# Patient Record
Sex: FEMALE | Race: BLACK OR AFRICAN AMERICAN | NOT HISPANIC OR LATINO | Employment: PART TIME | URBAN - METROPOLITAN AREA
[De-identification: names, ages, dates, MRNs, and addresses within clinical notes are randomized per-mention and may not be internally consistent; named-entity substitution may affect disease eponyms.]

---

## 2017-04-10 ENCOUNTER — HOSPITAL ENCOUNTER (EMERGENCY)
Facility: HOSPITAL | Age: 67
Discharge: HOME | End: 2017-04-10
Attending: SPECIALIST
Payer: MEDICARE

## 2017-04-10 VITALS
TEMPERATURE: 99 F | RESPIRATION RATE: 18 BRPM | DIASTOLIC BLOOD PRESSURE: 86 MMHG | BODY MASS INDEX: 27.6 KG/M2 | WEIGHT: 150 LBS | OXYGEN SATURATION: 99 % | SYSTOLIC BLOOD PRESSURE: 131 MMHG | HEIGHT: 62 IN | HEART RATE: 67 BPM

## 2017-04-10 DIAGNOSIS — M25.562 ACUTE PAIN OF LEFT KNEE: Primary | ICD-10-CM

## 2017-04-10 DIAGNOSIS — J01.00 ACUTE MAXILLARY SINUSITIS, RECURRENCE NOT SPECIFIED: ICD-10-CM

## 2017-04-10 DIAGNOSIS — T14.90XA TRAUMA: ICD-10-CM

## 2017-04-10 PROCEDURE — 96372 THER/PROPH/DIAG INJ SC/IM: CPT

## 2017-04-10 PROCEDURE — 99284 EMERGENCY DEPT VISIT MOD MDM: CPT | Mod: 25

## 2017-04-10 PROCEDURE — 25000003 PHARM REV CODE 250: Performed by: SPECIALIST

## 2017-04-10 PROCEDURE — 63600175 PHARM REV CODE 636 W HCPCS: Performed by: SPECIALIST

## 2017-04-10 RX ORDER — TRAMADOL HYDROCHLORIDE 50 MG/1
50 TABLET ORAL
Status: COMPLETED | OUTPATIENT
Start: 2017-04-10 | End: 2017-04-10

## 2017-04-10 RX ORDER — TRAMADOL HYDROCHLORIDE 50 MG/1
50 TABLET ORAL EVERY 6 HOURS PRN
Qty: 20 TABLET | Refills: 0 | Status: SHIPPED | OUTPATIENT
Start: 2017-04-10 | End: 2017-04-19 | Stop reason: ALTCHOICE

## 2017-04-10 RX ORDER — PREDNISONE 5 MG/1
TABLET ORAL
Qty: 21 TABLET | Refills: 0 | Status: SHIPPED | OUTPATIENT
Start: 2017-04-10 | End: 2017-04-19 | Stop reason: ALTCHOICE

## 2017-04-10 RX ORDER — KETOROLAC TROMETHAMINE 30 MG/ML
30 INJECTION, SOLUTION INTRAMUSCULAR; INTRAVENOUS
Status: COMPLETED | OUTPATIENT
Start: 2017-04-10 | End: 2017-04-10

## 2017-04-10 RX ORDER — AMOXICILLIN AND CLAVULANATE POTASSIUM 875; 125 MG/1; MG/1
1 TABLET, FILM COATED ORAL 2 TIMES DAILY
Qty: 14 TABLET | Refills: 0 | Status: SHIPPED | OUTPATIENT
Start: 2017-04-10 | End: 2017-04-20

## 2017-04-10 RX ADMIN — TRAMADOL HYDROCHLORIDE 50 MG: 50 TABLET, FILM COATED ORAL at 10:04

## 2017-04-10 RX ADMIN — KETOROLAC TROMETHAMINE 30 MG: 30 INJECTION, SOLUTION INTRAMUSCULAR at 10:04

## 2017-04-10 NOTE — ED AVS SNAPSHOT
[facility]  [address] [place] [address]               Chelly Mathur   4/10/2017 10:41 AM   ED    Description:  Female : 1950   Department:  [facility]           Your Care was Coordinated By:     Provider Role From To    Patricia Caldera MD Attending Provider 04/10/17 1041 --      Reason for Visit     Knee Pain     Nasal Congestion           Diagnoses this Visit        Comments    Acute pain of left knee    -  Primary     Trauma         Acute maxillary sinusitis, recurrence not specified           ED Disposition     None           To Do List           Follow-up Information     Schedule an appointment as soon as possible for a visit with Zahra Ames MD.    Specialty:  Internal Medicine    Contact information:    [address]  [place] [facility] [address] [phone]          Follow up with [facility].    Specialty:  Orthopedics    Why:  for further eval if symptoms continue    Contact information:    [address]  [facility] [address] [phone]    Additional information:    (off [facility]) 2nd floor        Follow up with [facility].    Specialty:  Emergency Medicine    Why:  If symptoms worsen or any new symptoms develop especially sob, calf pain/swelling/discoloration    Contact information:    [address] [facility] [address] [phone]       These Medications        Disp Refills Start End    amoxicillin-clavulanate 875-125mg (AUGMENTIN) 875-125 mg per tablet 14 tablet 0 4/10/2017 2017    Take 1 tablet by mouth 2 (two) times daily. - Oral    predniSONE (DELTASONE) 5 MG tablet 21 tablet 0 4/10/2017     Take 6 on day 1, 5 on day 2, 4 on day 3, 3 on day 4, 2 on day 5, and 1 on day 6    tramadol (ULTRAM) 50 mg tablet 20 tablet 0 4/10/2017 2017    Take 1 tablet (50 mg total) by mouth every 6 (six) hours as needed for  Pain. - Oral      [facility]     [facility] Care Line - 24/7 Assistance  Unless otherwise directed by your provider, please contact [facility], our nurse care line that is available for 24/7 assistance.     Registered nurses in the [facility] provide: appointment scheduling, clinical advisement, health education, and other advisory services.  Call: [phone] (toll free)               Medications           Message regarding Medications     Verify the changes and/or additions to your medication regime listed below are the same as discussed with your clinician today.  If any of these changes or additions are incorrect, please notify your healthcare provider.        START taking these NEW medications        Refills    amoxicillin-clavulanate 875-125mg (AUGMENTIN) 875-125 mg per tablet 0    Sig: Take 1 tablet by mouth 2 (two) times daily.    Class: Print    Route: Oral    predniSONE (DELTASONE) 5 MG tablet 0    Sig: Take 6 on day 1, 5 on day 2, 4 on day 3, 3 on day 4, 2 on day 5, and 1 on day 6    Class: Print    tramadol (ULTRAM) 50 mg tablet 0    Sig: Take 1 tablet (50 mg total) by mouth every 6 (six) hours as needed for Pain.    Class: Print    Route: Oral      These medications were administered today        Dose Freq    ketorolac injection 30 mg 30 mg ED 1 Time    Sig: Inject 30 mg into the muscle ED 1 Time.    Class: Normal    Route: Intramuscular    tramadol tablet 50 mg 50 mg ED 1 Time    Sig: Take 1 tablet (50 mg total) by mouth ED 1 Time.    Class: Normal    Route: Oral           Verify that the below list of medications is an accurate representation of the medications you are currently taking.  If none reported, the list may be blank. If incorrect, please contact your healthcare provider. Carry this list with you in case of emergency.           Current Medications     amoxicillin-clavulanate 875-125mg (AUGMENTIN) 875-125 mg per tablet Take 1 tablet by mouth 2 (two) times  "daily.    predniSONE (DELTASONE) 5 MG tablet Take 6 on day 1, 5 on day 2, 4 on day 3, 3 on day 4, 2 on day 5, and 1 on day 6    tramadol (ULTRAM) 50 mg tablet Take 1 tablet (50 mg total) by mouth every 6 (six) hours as needed for Pain.           Clinical Reference Information           Your Vitals Were     BP Pulse Temp Resp Height Weight    146/77 (BP Location: Right arm, Patient Position: Sitting) 77 98.8 °F (37.1 °C) (Oral) 18 5' 2" (1.575 m) 68 kg (150 lb)    SpO2 BMI             98% 27.44 kg/m2         Allergies as of 4/10/2017     No Known Allergies      Immunizations Administered on Date of Encounter - 4/10/2017     None      ED Micro, Lab, POCT     None      ED Imaging Orders     Start Ordered       Status Ordering Provider    04/10/17 1122 04/10/17 1046  X-Ray Knee Complete 4 or more Views Left  1 time imaging      Final result     04/10/17 1047 04/10/17 1046    1 time imaging,   Status:  Canceled      Canceled       Discharge References/Attachments     KNEE SPRAIN OF THE COLLATERAL LIGAMENTS (ENGLISH)    SINUSITIS (ANTIBIOTIC TREATMENT) (ENGLISH)      [facility] Sign-Up     Activating your [facility] account is as easy as 1-2-3!     1) Visit [email or web address], select Sign Up Now, enter this activation code and your date of birth, then select Next.  [email or web address]  Expires: 5/25/2017 11:51 AM      2) Create a username and password to use when you visit [facility] in the future and select a security question in case you lose your password and select Next.    3) Enter your e-mail address and click Sign Up!    Additional Information  If you have questions, please e-mail [email or web address] or call [phone] to talk to our [facility] staff. Remember, [facility] is NOT to be used for urgent needs. For medical emergencies, dial 911.         Smoking Cessation     If you would like to quit smoking:   You may be eligible for free services if you are a [place] resident and started smoking cigarettes before " September 1, 1988.  Call the Smoking Cessation Trust (SCT) toll free at (418) 020-9998 or (822) 310-6882.   Call 1-800-QUIT-NOW if you do not meet the above criteria.   Contact us via email: [email or web address]   View our website for more information: [email or web address]         [facility]  complies with applicable Federal civil rights laws and does not discriminate on the basis of race, color, national origin, age, disability, or sex.        Language Assistance Services     ATTENTION: Language assistance services are available, free of charge. Please call [phone].      ATENCIÓN: Si habla español, tiene a noel disposición servicios gratuitos de asistencia lingüística. Llame al [phone].     CHÚ Ý: N?u b?n nói Ti?ng Vi?t, có các d?ch v? h? tr? ngôn ng? mi?n phí dành cho b?n. G?i s? [phone].

## 2017-04-10 NOTE — ED PROVIDER NOTES
SCRIBE #1 NOTE: I, Kandis Diehl, am scribing for, and in the presence of, Patricia Caldera MD. I have scribed the entire note.      History      Chief Complaint   Patient presents with    Knee Pain     pt states she hurt her left knee after falling into a hole    Nasal Congestion       Review of patient's allergies indicates:  No Known Allergies     HPI   HPI    4/10/2017, 10:42 AM   History obtained from the patient      History of Present Illness: Chelly Mathur is a 67 y.o. female patient who presents to the Emergency Department for left knee pain which onset suddenly 1 week ago. Pt reports falling in a hole and landing on her knee. Pt reports twisting her knee during fall. Sx have been constant and moderate in severity. No modifying factors noted. No associated sx included. Pt denies any fever, chills, decreased ROM of left knee, paresthesias, or left knee swelling.    Pt is also complaining of sinus pressure which onset gradually a few days ago. Sx have been constant and moderate in severity. No modifying factors noted. Associated sx include sore throat, rhinorrhea, congestion, and eye itching. Pt denies any fever, chills, cough, trouble swallowing, ear pain, neck pain/stiffness, or rash. No further complaints at this time.       Arrival mode: Personal vehicle      PCP: Zahra Ames MD     Past Medical History:  Past medical history reviewed not relevant      Past Surgical History:  Past surgical history reviewed not relevant      Family History:  Family history reviewed not relevant      Social History:  Social History Main Topics    Smoking status: Unknown if ever smoked    Smokeless tobacco: Unknown if ever used    Alcohol Use: Unknown drinking history    Drug Use: Unknown if ever used    Sexual Activity: Unknown         ROS   Review of Systems   Constitutional: Negative for chills, diaphoresis and fever.   HENT: Positive for congestion, rhinorrhea, sinus pressure and sore throat. Negative for  ear discharge, ear pain and trouble swallowing.    Eyes: Positive for itching. Negative for photophobia, pain, discharge, redness and visual disturbance.   Respiratory: Negative for cough and shortness of breath.    Cardiovascular: Negative for chest pain.   Gastrointestinal: Negative for abdominal pain, blood in stool, constipation, diarrhea, nausea and vomiting.   Genitourinary: Negative for dysuria.   Musculoskeletal: Positive for arthralgias (left knee). Negative for back pain, joint swelling, neck pain and neck stiffness.   Skin: Negative for rash.   Neurological: Negative for dizziness, weakness, light-headedness, numbness and headaches.        (-) paresthesias   Hematological: Does not bruise/bleed easily.     Physical Exam    Initial Vitals   BP Pulse Resp Temp SpO2   04/10/17 1039 04/10/17 1039 04/10/17 1039 04/10/17 1039 04/10/17 1039   146/77 77 18 98.8 °F (37.1 °C) 98 %      Physical Exam  Nursing Notes and Vital Signs Reviewed.  Constitutional: Patient is in no acute distress. Awake and alert. Well-developed and well-nourished.  Head: Atraumatic. Normocephalic. Maxillary sinus tenderness.    Eyes: PERRL. EOM intact. Conjunctivae are not pale. No scleral icterus.  Nose: Patent nares. Turbinates are normal. No drainage.   Throat: Moist mucous membranes. Posterior oropharynx is symmetric without erythema. Tonsillar exudate is not present. No trismus. Normal handling of secretions. No stridor.  Neck: Supple. Full ROM. No lymphadenopathy. No meningeal signs.   Cardiovascular: Regular rate. Regular rhythm. No murmurs, rubs, or gallops. Distal pulses are 2+ and symmetric.  Pulmonary/Chest: No respiratory distress. Clear to auscultation bilaterally. No wheezing, rales, or rhonchi.  Abdominal: Soft and non-distended.    Musculoskeletal: Moves all extremities. No obvious deformities. No edema. No calf tenderness. Left Knee: Skin intact. No effusion. Medial point tenderness. Tenderness directly over the patella.  "Negative anterior and posterior drawer test. Negative Varus and Valgus stress test.   Skin: Warm and dry.  Neurological:  Alert, awake, and appropriate.  Normal speech.  No acute focal neurological deficits are appreciated.  Psychiatric: Normal affect. Good eye contact. Appropriate in content.    ED Course    Procedures  ED Vital Signs:  Vitals:    04/10/17 1039 04/10/17 1158   BP: (!) 146/77 131/86   Pulse: 77 67   Resp: 18    Temp: 98.8 °F (37.1 °C)    TempSrc: Oral    SpO2: 98% 99%   Weight: 68 kg (150 lb)    Height: 5' 2" (1.575 m)        Imaging Results:  Imaging Results         X-Ray Knee Complete 4 or more Views Left (Final result) Result time:  04/10/17 11:46:45    Procedure changed from X-Ray Knee 3 View Left        Final result by Juan Donaldson MD (04/10/17 11:46:45)    Impression:     No acute osseous abnormality identified in the left knee.        Electronically signed by: JUAN DONALDSON MD  Date:     04/10/17  Time:    11:46     Narrative:    Left knee x-ray 4 views    Clinical history: Left knee pain and acute injury.    Comparison: None    Findings: No fracture or dislocation identified.  No significant joint effusion identified. Joint spaces appear well maintained.                     The Emergency Provider reviewed the vital signs and test results, which are outlined above.    ED Discussion     11:39 AM Reassessed pt at this time.   Discussed with pt all pertinent ED information and results. Discussed pt dx and plan of tx. Gave pt all f/u and return to the ED instructions. All questions and concerns were addressed at this time. Pt expresses understanding of information and instructions, and is comfortable with plan to discharge. Pt is stable for discharge.    I discussed with patient and/or family/caretaker that evaluation in the ED does not suggest any emergent or life threatening medical conditions requiring immediate intervention beyond what was provided in the ED, and I believe patient is " safe for discharge. Discussed DVT precautions. Instructed pt to return to ED for any new leg swelling or calf pain.    ED Medication(s):  Medications   ketorolac injection 30 mg (30 mg Intramuscular Given 4/10/17 1054)   tramadol tablet 50 mg (50 mg Oral Given 4/10/17 1054)       Discharge Medication List as of 4/10/2017 11:51 AM      START taking these medications    Details   amoxicillin-clavulanate 875-125mg (AUGMENTIN) 875-125 mg per tablet Take 1 tablet by mouth 2 (two) times daily., Starting 4/10/2017, Until u 4/20/17, Print      predniSONE (DELTASONE) 5 MG tablet Take 6 on day 1, 5 on day 2, 4 on day 3, 3 on day 4, 2 on day 5, and 1 on day 6, Print      tramadol (ULTRAM) 50 mg tablet Take 1 tablet (50 mg total) by mouth every 6 (six) hours as needed for Pain., Starting 4/10/2017, Until u 4/20/17, Print           Follow-up Information     Schedule an appointment as soon as possible for a visit with Zahra Ames MD.    Specialty:  Internal Medicine    Contact information:    [address]   [address] [facility] [address] [phone]          Follow up with [facility].    Specialty:  Orthopedics    Why:  for further eval if symptoms continue    Contact information:    [address] [facility] [address] [phone]    Additional information:    (off [facility]) 2nd floor        Follow up with [facility]    Specialty:  Emergency Medicine    Why:  If symptoms worsen or any new symptoms develop especially sob, calf pain/swelling/discoloration    Contact information:    [address]  [facility] [address] [phone]          Medical Decision Making    Medical Decision Making:   Clinical Tests:   Radiological Study: Ordered and Reviewed           Scribe Attestation:   Scribe #1: I performed the above scribed service and the documentation accurately describes the services I performed. I attest to the accuracy of the  note.    Attending:   Physician Attestation Statement for Scribe #1: I, Patricia Caldera MD, personally performed the services described in this documentation, as scribed by Kandis Diehl, in my presence, and it is both accurate and complete.          Clinical Impression       ICD-10-CM ICD-9-CM   1. Acute pain of left knee M25.562 719.46   2. Trauma T14.90 959.9   3. Acute maxillary sinusitis, recurrence not specified J01.00 461.0       Disposition:   Disposition: Discharged  Condition: Stable         Patricia Caldera MD  04/11/17 1532

## 2017-04-19 ENCOUNTER — OFFICE VISIT (OUTPATIENT)
Dept: ORTHOPEDICS | Facility: CLINIC | Age: 67
End: 2017-04-19
Payer: MEDICARE

## 2017-04-19 ENCOUNTER — HOSPITAL ENCOUNTER (OUTPATIENT)
Dept: RADIOLOGY | Facility: HOSPITAL | Age: 67
Discharge: HOME | End: 2017-04-19
Attending: ORTHOPAEDIC SURGERY
Payer: MEDICARE

## 2017-04-19 VITALS
HEART RATE: 95 BPM | WEIGHT: 142 LBS | SYSTOLIC BLOOD PRESSURE: 126 MMHG | DIASTOLIC BLOOD PRESSURE: 76 MMHG | BODY MASS INDEX: 26.13 KG/M2 | HEIGHT: 62 IN

## 2017-04-19 DIAGNOSIS — M25.562 ACUTE PAIN OF LEFT KNEE: Primary | ICD-10-CM

## 2017-04-19 DIAGNOSIS — M25.562 LEFT KNEE PAIN, UNSPECIFIED CHRONICITY: Primary | ICD-10-CM

## 2017-04-19 DIAGNOSIS — M25.562 LEFT KNEE PAIN, UNSPECIFIED CHRONICITY: ICD-10-CM

## 2017-04-19 PROCEDURE — 73562 X-RAY EXAM OF KNEE 3: CPT | Mod: TC,50,PO,LT

## 2017-04-19 PROCEDURE — 99999 PR PBB SHADOW E&M-EST. PATIENT-LVL III: CPT | Mod: PBBFAC,,, | Performed by: PHYSICIAN ASSISTANT

## 2017-04-19 PROCEDURE — 73562 X-RAY EXAM OF KNEE 3: CPT | Mod: 26,50,, | Performed by: RADIOLOGY

## 2017-04-19 PROCEDURE — 99203 OFFICE O/P NEW LOW 30 MIN: CPT | Mod: 25,S$GLB,, | Performed by: PHYSICIAN ASSISTANT

## 2017-04-19 PROCEDURE — 1160F RVW MEDS BY RX/DR IN RCRD: CPT | Mod: S$GLB,,, | Performed by: PHYSICIAN ASSISTANT

## 2017-04-19 PROCEDURE — 20610 DRAIN/INJ JOINT/BURSA W/O US: CPT | Mod: LT,S$GLB,, | Performed by: PHYSICIAN ASSISTANT

## 2017-04-19 RX ORDER — METHYLPREDNISOLONE ACETATE 80 MG/ML
80 INJECTION, SUSPENSION INTRA-ARTICULAR; INTRALESIONAL; INTRAMUSCULAR; SOFT TISSUE ONCE
Status: COMPLETED | OUTPATIENT
Start: 2017-04-19 | End: 2017-04-19

## 2017-04-19 RX ORDER — MELOXICAM 15 MG/1
15 TABLET ORAL DAILY
Qty: 30 TABLET | Refills: 0 | Status: SHIPPED | OUTPATIENT
Start: 2017-04-19 | End: 2017-05-19 | Stop reason: ALTCHOICE

## 2017-04-19 RX ADMIN — METHYLPREDNISOLONE ACETATE 80 MG: 80 INJECTION, SUSPENSION INTRA-ARTICULAR; INTRALESIONAL; INTRAMUSCULAR; SOFT TISSUE at 03:04

## 2017-04-19 NOTE — MR AVS SNAPSHOT
[facility]  [address] [facility] [address] [place] [address]  Phone: [phone]  Fax: [phone]                  Chelly Mathur   2017 2:15 PM   Office Visit    Description:  Female : 1950   Provider:  Karoline Todd PA-C   Department:  [facility]           Reason for Visit     Left Knee - Pain           Diagnoses this Visit        Comments    Acute pain of left knee    -  Primary            To Do List           Future Appointments        Provider Department Dept Phone    2017 2:00 PM Karoline Todd PA-C [facility] [phone]      Goals (5 Years of Data)     None       These Medications        Disp Refills Start End    meloxicam (MOBIC) 15 MG tablet 30 tablet 0 2017     Take 1 tablet (15 mg total) by mouth once daily. - Oral    Pharmacy: [address] [place], [place] [address] [facility] #: [phone]         [facility]     [facility] On Call Nurse Care Line -  Assistance  Unless otherwise directed by your provider, please contact [facility], our nurse care line that is available for  assistance.     Registered nurses in the [facility] provide: appointment scheduling, clinical advisement, health education, and other advisory services.  Call: [phone] (toll free)               Medications           Message regarding Medications     Verify the changes and/or additions to your medication regime listed below are the same as discussed with your clinician today.  If any of these changes or additions are incorrect, please notify your healthcare provider.        START taking these NEW medications        Refills    meloxicam (MOBIC) 15 MG tablet 0    Sig: Take 1 tablet (15 mg total) by mouth once daily.    Class: Normal    Route: Oral      These medications were administered today        Dose Freq    methylPREDNISolone acetate injection 80 mg 80 mg Once    Sig: Inject 1 mL (80 mg total) into the  "articular space once.    Class: Normal    Route: Intra-articular      STOP taking these medications     predniSONE (DELTASONE) 5 MG tablet Take 6 on day 1, 5 on day 2, 4 on day 3, 3 on day 4, 2 on day 5, and 1 on day 6    tramadol (ULTRAM) 50 mg tablet Take 1 tablet (50 mg total) by mouth every 6 (six) hours as needed for Pain.           Verify that the below list of medications is an accurate representation of the medications you are currently taking.  If none reported, the list may be blank. If incorrect, please contact your healthcare provider. Carry this list with you in case of emergency.           Current Medications     amoxicillin-clavulanate 875-125mg (AUGMENTIN) 875-125 mg per tablet Take 1 tablet by mouth 2 (two) times daily.    meloxicam (MOBIC) 15 MG tablet Take 1 tablet (15 mg total) by mouth once daily.           Clinical Reference Information           Your Vitals Were     BP Pulse Height Weight BMI    126/76 95 5' 2" (1.575 m) 64.4 kg (141 lb 15.6 oz) 25.97 kg/m2      Blood Pressure          Most Recent Value    BP  126/76      Allergies as of 4/19/2017     No Known Allergies      Immunizations Administered on Date of Encounter - 4/19/2017     None      Orders Placed During Today's Visit      Normal Orders This Visit    Ambulatory Referral to Physical/Occupational Therapy       Language Assistance Services     ATTENTION: Language assistance services are available, free of charge. Please call [phone].      ATENCIÓN: Si habla español, tiene a noel disposición servicios gratuitos de asistencia lingüística. Llame al [phone].     [facility] Ý: N?u b?n nói Ti?ng Vi?t, có các d?ch v? h? tr? ngôn ng? mi?n phí dành cho b?n. G?i s? [phone].         [facility] complies with applicable Federal civil rights laws and does not discriminate on the basis of race, color, national origin, age, disability, or sex.        "
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
## 2017-05-19 ENCOUNTER — OFFICE VISIT (OUTPATIENT)
Dept: ORTHOPEDICS | Facility: CLINIC | Age: 67
End: 2017-05-19
Payer: MEDICARE

## 2017-05-19 VITALS
RESPIRATION RATE: 12 BRPM | HEIGHT: 62 IN | DIASTOLIC BLOOD PRESSURE: 70 MMHG | WEIGHT: 138.44 LBS | SYSTOLIC BLOOD PRESSURE: 119 MMHG | HEART RATE: 76 BPM | BODY MASS INDEX: 25.48 KG/M2

## 2017-05-19 DIAGNOSIS — M25.562 ACUTE PAIN OF LEFT KNEE: Primary | ICD-10-CM

## 2017-05-19 PROCEDURE — 99999 PR PBB SHADOW E&M-EST. PATIENT-LVL III: CPT | Mod: PBBFAC,,, | Performed by: PHYSICIAN ASSISTANT

## 2017-05-19 PROCEDURE — 1160F RVW MEDS BY RX/DR IN RCRD: CPT | Mod: S$GLB,,, | Performed by: PHYSICIAN ASSISTANT

## 2017-05-19 PROCEDURE — 99212 OFFICE O/P EST SF 10 MIN: CPT | Mod: S$GLB,,, | Performed by: PHYSICIAN ASSISTANT

## 2017-05-19 RX ORDER — ASPIRIN 81 MG/1
81 TABLET ORAL DAILY
COMMUNITY

## 2017-05-19 NOTE — PROGRESS NOTES
Patient ID: Chelly Mathur is a 67 y.o. female.    Chief Complaint: Pain of the Left Knee      HPI: Chelly Mathur  is a 67 y.o. female who c/o Pain of the Left Knee   for duration of just over a month.  I saw her about 4 weeks ago and got her started with some formal physical therapy.  At her last office visit I gave her a steroid injection in the left knee.  She states between the steroid ejection and the physical therapy, she is back to normal.  She has no pain.  She is happy with her progress.  She'll having any problems and no functional limitations.  Pain level today is 0 out of 10.        Objective:        General    Nursing note and vitals reviewed.    General Musculoskeletal Exam   Gait: normal       Right Knee Exam   Right knee exam is normal.    Inspection   Erythema: absent  Swelling: absent  Effusion: effusion  Deformity: deformity  Bruising: absent    Tenderness   The patient is experiencing no tenderness.         Range of Motion   Extension: normal   Flexion: normal     Tests   Meniscus   Adalberto:  Medial - negative Lateral - negative  Ligament Examination Lachman: normal (-1 to 2mm) PCL-Posterior Drawer: normal (0 to 2mm)     MCL - Valgus: normal (0 to 2mm)  LCL - Varus: normal  Patella   Patellar Apprehension: negative  Patellar Tracking: normal  Patellar Grind: negative    Other   Meniscal Cyst: absent  Popliteal (Baker's) Cyst: absent  Sensation: normal    Comments:  Comp soft, cap refill < 2 sec.    Muscle Strength   Right Lower Extremity   Quadriceps:  5/5   Hamstrin/5               Assessment:       Encounter Diagnosis   Name Primary?    Acute pain of left knee Yes          Plan:       Chelly was seen today for pain.    Diagnoses and all orders for this visit:    Acute pain of left knee      Ms Spauldign comes in today to follow-up on the above problem.  She is doing quite well.  I recommend continuing with a home exercise program.  Otherwise, she can resume activities as tolerated.  I will  see her back on an as-needed basis.  If she has trouble in the future, she will notify the office.  Patient verbalizes understanding and agrees.  Return if symptoms worsen or fail to improve.          The patient understands, chooses and consents to this plan and accepts all   the risks which include but are not limited to the risks mentioned above.     Disclaimer: This note was prepared using a voice recognition system and is likely to have sound alike errors within the text.

## 2020-02-01 ENCOUNTER — HOSPITAL ENCOUNTER (EMERGENCY)
Facility: HOSPITAL | Age: 70
Discharge: HOME | End: 2020-02-01
Attending: EMERGENCY MEDICINE
Payer: MEDICARE

## 2020-02-01 VITALS
HEIGHT: 63 IN | TEMPERATURE: 99 F | BODY MASS INDEX: 27.05 KG/M2 | RESPIRATION RATE: 14 BRPM | WEIGHT: 152.69 LBS | OXYGEN SATURATION: 98 % | HEART RATE: 70 BPM | DIASTOLIC BLOOD PRESSURE: 80 MMHG | SYSTOLIC BLOOD PRESSURE: 142 MMHG

## 2020-02-01 DIAGNOSIS — M19.019 ARTHRITIS OF SHOULDER REGION: ICD-10-CM
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
## 2020-02-01 DIAGNOSIS — M25.512 ACUTE PAIN OF LEFT SHOULDER: ICD-10-CM

## 2020-02-01 DIAGNOSIS — R03.0 ELEVATED BLOOD PRESSURE READING: ICD-10-CM

## 2020-02-01 PROCEDURE — 99283 EMERGENCY DEPT VISIT LOW MDM: CPT | Mod: 25

## 2020-02-01 RX ORDER — DICLOFENAC SODIUM 10 MG/G
2 GEL TOPICAL 2 TIMES DAILY
Qty: 100 G | Refills: 0 | Status: SHIPPED | OUTPATIENT
Start: 2020-02-01

## 2020-02-01 RX ORDER — ATORVASTATIN CALCIUM 20 MG/1
20 TABLET, FILM COATED ORAL
COMMUNITY
Start: 2020-01-27

## 2020-02-01 RX ORDER — ALENDRONATE SODIUM 70 MG/1
70 TABLET ORAL
COMMUNITY
Start: 2020-01-27

## 2020-02-01 RX ORDER — LEVOCETIRIZINE DIHYDROCHLORIDE 5 MG/1
5 TABLET, FILM COATED ORAL
COMMUNITY
Start: 2019-06-24

## 2020-02-01 RX ORDER — ERGOCALCIFEROL 1.25 MG/1
50000 CAPSULE ORAL
COMMUNITY
Start: 2020-01-27 | End: 2021-01-26
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
